# Patient Record
Sex: MALE | Race: WHITE | URBAN - METROPOLITAN AREA
[De-identification: names, ages, dates, MRNs, and addresses within clinical notes are randomized per-mention and may not be internally consistent; named-entity substitution may affect disease eponyms.]

---

## 2018-07-13 ENCOUNTER — HOSPITAL ENCOUNTER (OUTPATIENT)
Dept: BEHAVIORAL HEALTH | Facility: CLINIC | Age: 28
Discharge: HOME | End: 2018-07-13
Attending: SOCIAL WORKER | Admitting: SOCIAL WORKER
Payer: COMMERCIAL

## 2018-07-13 VITALS
HEIGHT: 73 IN | WEIGHT: 247.6 LBS | BODY MASS INDEX: 32.82 KG/M2 | DIASTOLIC BLOOD PRESSURE: 100 MMHG | HEART RATE: 96 BPM | SYSTOLIC BLOOD PRESSURE: 145 MMHG

## 2018-07-13 PROCEDURE — H0001 ALCOHOL AND/OR DRUG ASSESS: HCPCS

## 2018-07-13 RX ORDER — DISULFIRAM 250 MG/1
250 TABLET ORAL DAILY
COMMUNITY

## 2018-07-13 RX ORDER — CITALOPRAM HYDROBROMIDE 20 MG/1
20 TABLET ORAL
COMMUNITY

## 2018-07-13 ASSESSMENT — ANXIETY QUESTIONNAIRES
4. TROUBLE RELAXING: NEARLY EVERY DAY
3. WORRYING TOO MUCH ABOUT DIFFERENT THINGS: NEARLY EVERY DAY
1. FEELING NERVOUS, ANXIOUS, OR ON EDGE: NEARLY EVERY DAY
7. FEELING AFRAID AS IF SOMETHING AWFUL MIGHT HAPPEN: SEVERAL DAYS
GAD7 TOTAL SCORE: 16
5. BEING SO RESTLESS THAT IT IS HARD TO SIT STILL: MORE THAN HALF THE DAYS
6. BECOMING EASILY ANNOYED OR IRRITABLE: MORE THAN HALF THE DAYS
2. NOT BEING ABLE TO STOP OR CONTROL WORRYING: MORE THAN HALF THE DAYS

## 2018-07-13 ASSESSMENT — PAIN SCALES - GENERAL: PAINLEVEL: NO PAIN (0)

## 2018-07-13 NOTE — PROGRESS NOTES
Vulnerable Adult Checklist for OUTPATIENTS     1.  Do you have a physical, emotional or mental infirmity or dysfunction?       Yes (explain) - PTSD, maybe depression    2.  Does this issue impair your ability to provide for your own care without help, including providing yourself with food, shelter, clothing, healthcare or supervision?       No    3.  Because of this issue, I need assistance to protect myself from maltreatment by others.      No    Based on the above information:    This person is not a functional Vulnerable Adult according to [place] Statute 626.5572 subdivision 21.

## 2018-07-13 NOTE — PROGRESS NOTES
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 IV - Readiness to Change     1. What is your motivation for participating in this evaluation today?    \"I don't want to have a ball and chain.\"    2. What problematic behaviors have you engaged in when using alcohol or other drugs that could be hazardous to you or others (i.e. driving a car/motorcycle/boat, operating machinery, unsafe sex, IV drug use, sharing needles, etc.)  (DSM-5: Sx #8)    Opiates & Alcohol: Driving, not go to work  THC: no    3. If applicable, when did you first think you had a problem with your alcohol or other drug use?    THC: \"I almost still don't. I was still thinking about doing the marijuana maintance program to be honest.\"  Alcohol: \"when I was 16. I should have gotten 5 DWIs, when I only got 1.\"  Opiates: \"about a year ago. I was doing it recreationally 1-2 times a month and then I needed it daily to go to work.\"    4. Who in your life has shared concerns with you about your use of alcohol or other drugs?    \"Mom, dad, brother, and work.\"    5. Are there any changes you have made or plan to make regarding how you had been using alcohol or other drugs?    Yes, explain: \"I am done drinking and doing oxy for sure. My brother confronted my (oxy) dealer and he is " "scared.\"    Dimension IV Ratings Summary   Readiness for Change - The placing authority must use the criteria in Dimension IV to determine a client s readiness for change.   RISK DESCRIPTIONS - Severity ratin Client displays verbal compliance, but lacks consistent behaviors; has low motivation for change; and is passively involved in treatment.    REASONS SEVERITY WAS ASSIGNED - (What information did the person provide that supports your assessment of his or her readiness to change? How aware is the person of problems caused by continued use? How willing is she or he to make changes? What does the person feel would be helpful? What has the person been able to do without help?)      Patient admits he has a problem with alcohol and oxy.  The patient doesn't believe he has a problem with marijuana.  Patient displays verbal compliance and motivation but lacks consistent behaviors and follow-through. Pt smoked marijuana last night after coming home from [facility] in [place]. Pt is willing to attend an [facility] CD treatment program.  Pt appears to be in the \"contemplation\" Stage within the Stages of Change Model when it comes to his marijuana use and in the \"preparation\" Stage within the Stages of Change Model when it comes to his alcohol and oxy use.        DIMENSION V - Relapse, Continued Use and Continued Problem Potential     1. If you have had previous periods of sobriety, when was your longest period of sobriety and what were you doing at that time that was supporting your sobriety?  (DSM-5: Sx #2)    Sober time: 100% sober for 6 months in   How: \"meetings every day, talking to my sponsor every day. Doing it exactly like you are suppose to.\"  Why did you relapse on marijuana: \"I didn't think it was a problem. I started lying to people at  that I was still.\" He still continued to  meetings while he was smoking marijuana.  Why did you relapse on alcohol: he doesn't know why he relapsed in April.    2. Within the " "past 30 days, on a scale from 0-10 (0 = having no cravings at all and 10 = having very strong cravings to use alcohol or other drugs) what number would you assign to your cravings? (DSM-5: Sx #4)     THC: 7  Alcohol: 9  Opiates: 2    How are you coping: \"talking to people, doing a lot of my readings, breathing exercises, eating a lot of sugar.\"    3. Can you identify any specific reasons or specific triggers that contribute to you being more likely to consume alcohol or other drugs? (DSM-5: Sx #4)    THC: \"stressful situations.\"  Alcohol: \"I don't really know my triggers for alcohol.\"  Opiates: \"I don't really know. It just became a need for me.\"    4. Have you been treated for alcohol/other substance use disorder? (DSM-5: Sx #2)  Yes  4B. Number of times(lifetime) (over what period) 2.   4C. Number of times completed treatment (lifetime) 1.   4D. During the past three years have you participated in outpatient and/or residential?  Yes  4E. When and where?   [facility]: 2015 for 28 days and was sober 100% for 6 months. Sober from alcohol for 3 years.  [facility]: 6/27-7/12/2018. He requested to leave early since he did not care for their program.  4F. What was helpful? What was not? \"meditation in the morning, meetings, this one had a 1/3 of the activities. You just sit there and think about shit.\"  [facility] [place] [facility].    5. Support group participation: Have you/do you attend 12-step or other support group meetings? How recently? What was your experience? Are you willing to restart? If the person has not participated, is he or she willing?  (DSM-5: Sx #2)    \"Today will be my first day since 6/27/2018. I was going every day when I was at [facility].\"  He has a sponsor that he is meeting today.    6. Do you drink alcohol or use other drugs in larger amounts than intended or over a longer period of time than was intended?  (DSM-5: Sx #1)    THC: daily  Alcohol: daily  Opiates: daily    7. Do you spend a " "great deal of time engaged in activities necessary to obtain alcohol or other drugs, a great deal of time using alcohol or other drugs, or a great deal of time recovering from alcohol or other drug use?  (DSM-5: Sx #3)    THC: \"at night to go to sleep. If I was pissed off at work I would do it on my lunch break.\"  Alcohol: He drank throughout the day, he would drink every 2 hours.  Opiates: He took the same amount every 3-4 hours throughout the day. He took a total of 120mg per day.    Dimension V Ratings Summary   Relapse/Continued Use/Continued problem potential - The placing authority must use the criteria in Dimension V to determine a client s relapse, continued use, and continued problem potential.   RISK DESCRIPTIONS - Severity rating: 3 Client has poor recognition and understanding of relapse and recidivism issues and displays moderately high vulnerability for further substance use or mental health problems. Client has few coping skills and rarely applies coping skills.    REASONS SEVERITY WAS ASSIGNED - (What information did the person provide that indicates his or her understanding of relapse issues? What about the person s experience indicates how prone he or she is to relapse? What coping skills does the person have that decrease relapse potential?)      Patient attended 28 days at [facility] in 2015 and was sober from alcohol for 3 years afterwards. He was at [facility] in [place] from 6/27/2018 until he left yesterday, 7/12/2018. He was able to stay sober from alcohol by attending AA meetings and working with his sponsor.  He plans on going to a meeting today and meeting with his sponsor today as well. He stated he uses marijuana to help him cope with stressful situations. Pt struggled to identify his triggers for alcohol and oxy.  Pt has some impulse control along with sober coping skills to stay sober from alcohol.  Pt is at a high risk for continued marijuana use and a low risk for alcohol " "and oxy use.     DIMENSION VI - Recovery Environment     1. Are you employed or attending school?    He was suspended from work 3 weeks ago in June 2018 for not calling or showing up for work. He stated he has not heard from them since he has been at [facility]. 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    2. If working or a student, are you able to function appropriately in that setting?     Yes    3. Has your job and/or school work been negatively impacted by your use of alcohol of other drugs?  (DSM-5: Sx #5 & Sx #7)    Yes, explain: Calling in sick to work, not showing up due to his use.    4. How would you describe your current finances?  Doing okay     5. Are you having financial problems, such as money being tight, living paycheck to paycheck, having unpaid or late bills, having significant debt, a history of bankruptcy, or IRS problems?    No     6. Describe a typical day; evening for you. Work, school, social, leisure activities, volunteer, exercise, spiritual practices or other daily tasks.    \"I would wake up at 4am, start work by 6. I would already be drinking by 6. I would have oxy ready in my sock. The owner's son has been trying to get me to quit so he has been lessening and lessening my hours.\" He was drinking and using throughout the day.    7. Have you reduced or discontinued recreational activities, hobbies or other leisure activities as a result of your use of alcohol or other drugs?  (DSM-5: Sx #7)    Yes, explain: biking.     8. Who do you live with?      He is currently living with his parents. He has been living with them since 2015.    9. Are there any people in the home who have current substance abuse issues or have mental health issues?     Yes, explain: his parents are actively drinking. They drink daily.    10. Tell me about your living environment/neighborhood? Ask enough follow up questions to determine safety, criminal activity, availability of alcohol and " "drugs, supportive or antagonistic to the person making changes.      No concerns outside of his parents drinking daily.    11. Are you concerned for your safety or anyone else's safety in the home? No    12. Do you have plans to move somewhere else or change your living environment in any manner?    Yes, explain: He is thinking about living at the [facility].     13. Do you have children who live with you?     No    14. Do you have children who do not live with you?     No    15. Do you have any history of being involved with Child Protection Services?  No     16. Are you currently in a significant relationship?     No    17. How do you identify your sexual orientation?    Heterosexual    18. The patient reported: being single, with no serious involvement.    19. Does your significant other have a history of substance abuse or have current substance abuse issues?    NA    20. How important is substance use to your social connections? Do many of your family or friends use?     Friends: no, most of his friends are sober    21. Who in your life would you consider to be your primary support network at this time?    \"My sponsor, brother, and parents.\"    22. Have any of your relationships (S.O., family members, friends, employers, teachers, etc.) been negatively impacted by your use of alcohol or other drugs?  (DSM-5: Sx #6)    Yes, explain: work.    23. Do you currently participate in community ronald activities, such as attending Samaritan, temple, Amish or Nondenominational services?  No    24. Criminal justice history: Gather current/recent history and any significant history related to substance use--Arrests? Convictions? Circumstances? Alcohol or drug involvement? Sentences? Still on probation or parole? Expectations of the court? Current court order?  (DSM-5: Sx #8)    DWI: 2011.  No probation    25. Are you or have you ever been a registered sex offender?  No    26. Do you have a child protection worker, probation " officer or ?  No    27. Are you currently on any type of commitment?  No, I'm not on any type of commitment at this time.    28. Do you have a valid 's license?  Yes    29. What obstacles exist to participating in treatment? (Time off work, childcare, funding, transportation, pending alf time, living situation)     Time off of work    Dimension VI Ratings Summary   Recovery environment - The placing authority must use the criteria in Dimension VI to determine a client s recovery environment.   RISK DESCRIPTIONS - Severity ratin Client is engaged in structured, meaningful activity, but peers, family, significant other, and living environment are unsupportive, or there is criminal justice involvement by the client or among the client s peers, significant others, or in the client s living environment.    REASONS SEVERITY WAS ASSIGNED - (What support does the person have for making changes? What structure/stability does the person have in his or her daily life that will increase the likelihood that changes can be sustained? What problems exist in the person s environment that will jeopardize getting/staying clean and sober?)     The patient currently lives with his parents who are both active alcoholics. The patient reported having a relationship conflict with his family and work due to his ongoing substance abuse issues. The patient identified as being heterosexual and he denied being in a romantic relationship at this time.  The patient reported having a history of legal issues, including a DWI from . 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  The patient denied having some increased financial stress.  The patient has a current sober peer support network.       Mental Health Status   Physical Appearance/Attire: Appears stated age   Hygiene: well groomed   Eye Contact: at examiner   Speech Rate:   regular   Speech Volume: regular   Speech Quality: fluid   Cognitive/Perceptual:  reality based   Cognition: memory intact    Judgment: intact   Insight: intact   Orientation:  time, place, person and situation   Thought:   logical    Hallucinations:  none   General Behavioral Tone: cooperative   Psychomotor Activity: no problem noted   Gait:  no problem   Mood: normal and appropriate   Affect: congruence/appropriate   Counselor Notes: NA     Patient Choices/Exceptions     Would you like services specific to language, age, gender, culture, Church preference, race, ethnicity, sexual orientation or disability?  No    What particular treatment choices and options would you like to have? IOP    Do you have a preference for a particular treatment program?  open    Patient is willing to follow treatment recommendations.  Yes    DSM-5 Criteria for Substance Use Disorder   Criteria for Diagnosis  Instructions: Determine whether the client currently meets the criteria for Substance Use Disorder using the diagnostic criteria in the DSM-5 pp.[id number]. Current means during the most recent 12 months outside a facility that controls access to substances.    A problematic pattern of alcohol/drug use leading to clinically significant impairment or distress, as manifested by at least two of the following, occurring within a 12-month period:    1. Alcohol/drug is often taken in larger amounts or over a longer period than was intended.  2. There is a persistent desire or unsuccessful efforts to cut down or control alcohol/drug use  3. A great deal of time is spent in activities necessary to obtain alcohol/drug, use alcohol/drug, or recover from its effects.  4. Craving, or a strong desire or urge to use alcohol/drug  5. Recurrent alcohol/drug use resulting in a failure to fulfill major role obligations at work, school or home.  6. Continued alcohol use despite having persistent or recurrent social or interpersonal problems caused  or exacerbated by the effects of alcohol/drug.  7. Important social, occupational, or recreational activities are given up or reduced because of alcohol/drug use.  8. Recurrent alcohol/drug use in situations in which it is physically hazardous.  9. Alcohol/drug use is continued despite knowledge of having a persistent or recurrent physical or psychological problem that is likely to have been caused or exacerbated by alcohol.  10. Tolerance, as defined by either of the following: A need for markedly increased amounts of alcohol/drug to achieve intoxication or desired effect.  11. Withdrawal, as manifested by either of the following: The characteristic withdrawal syndrome for alcohol/drug (refer to Criteria A and B of the criteria set for alcohol/drug withdrawal).          Specify if: In early remission:  After full criteria for alcohol/drug use disorder were previously met, none of the criteria for alcohol/drug use disorder have been met for at least 3 months but for less than 12 months (with the exception that Criterion A4,  Craving or a strong desire or urge to use alcohol/drug  may be met).     In sustained remission:   After full criteria for alcohol use disorder were previously met, non of the criteria for alcohol/drug use disorder have been met at any time during a period of 12 months or longer (with the exception that Criterion A4,  Craving or strong desire or urge to use alcohol/drug  may be met).   Specify if:   This additional specifier is used if the individual is in an environment where access to alcohol is restricted.    Mild: Presence of 2-3 symptoms  Moderate: Presence of 4-5 symptoms  Severe: Presence of 6 or more symptoms    DSM-5 Substance Use Disorder Diagnosis     Alcohol Use Disorder Severe - 303.90 (F10.20)  Cannabis Use Disorder Moderate - 304.30 (F12.20)  Opioid Use Disorder Severe - 304.00 (F11.20)  Depression NOS, per patient self-report  PTSD, per patient self-report    Collateral Contact  Summary     Number of contacts made:  1    Contact with referring person:  No    If court related records were reviewed, summarize here:  NA    Information from collateral contacts supported/largely agreed with information from the client and associated risk ratings.    Collateral Contact      Name: Relationship: Phone number: Releases:   [facility] EMR NA NA     The patient's medical record at [facility] was reviewed and the information contained in the medical record supported the patient's account of his chemical use history and chemical use consequences.      Recommendations     1)  Complete an Intensive Outpatient CD Treatment Program, such as at [facility] with Nayeli Nelson.  2)  Abstain from all mood-altering chemicals unless prescribed by a licensed provider.   3)  Attend, at minimum, 2 weekly support group meetings, such as 12 step based (AA/NA), SMART Recovery, Health Realizations, and/or Refuge Recovery meetings.     4)  Actively work with your male sponsor on a weekly basis.   5)  Follow all the recommendations of your treatment/medical providers.  6)  Begin attending individual psychotherapy.      Level of Care   I.) Intoxication and Withdrawal: 0   II.) Biomedical:  0   III.) Emotional and Behavioral:  2   IV.) Readiness to Change:  2   V.) Relapse Potential: 3   VI.) Recovery Environmental: 2     Initial Problem List     The patient is currently living in an unhealthy and/or using environment  The patient lacks relapse prevention skills  The patient has poor coping skills  The patient has poor refusal skills   The patient has dual issues of MI and CD  The patient lacks the ability to effectively manage his/her mental health issues

## 2018-07-14 ASSESSMENT — ANXIETY QUESTIONNAIRES: GAD7 TOTAL SCORE: 16

## 2018-07-14 ASSESSMENT — PATIENT HEALTH QUESTIONNAIRE - PHQ9: SUM OF ALL RESPONSES TO PHQ QUESTIONS 1-9: 23
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
## 2025-06-17 ENCOUNTER — MEDICAL CORRESPONDENCE (OUTPATIENT)
Dept: HEALTH INFORMATION MANAGEMENT | Facility: CLINIC | Age: 35
End: 2025-06-17

## 2025-06-17 ENCOUNTER — TRANSCRIBE ORDERS (OUTPATIENT)
Dept: OTHER | Age: 35
End: 2025-06-17

## 2025-06-17 ENCOUNTER — TRANSFERRED RECORDS (OUTPATIENT)
Dept: HEALTH INFORMATION MANAGEMENT | Facility: CLINIC | Age: 35
End: 2025-06-17

## 2025-06-17 DIAGNOSIS — H66.92 LEFT OTITIS MEDIA, UNSPECIFIED OTITIS MEDIA TYPE: Primary | ICD-10-CM

## 2025-07-01 ENCOUNTER — PATIENT OUTREACH (OUTPATIENT)
Dept: CARE COORDINATION | Facility: CLINIC | Age: 35
End: 2025-07-01
Payer: COMMERCIAL

## 2025-07-03 ENCOUNTER — PATIENT OUTREACH (OUTPATIENT)
Dept: CARE COORDINATION | Facility: CLINIC | Age: 35
End: 2025-07-03
Payer: COMMERCIAL